# Patient Record
(demographics unavailable — no encounter records)

---

## 2024-11-06 NOTE — HISTORY OF PRESENT ILLNESS
[FreeTextEntry1] : Verbal consent for telehealth services was obtained from the patient. Visit provided using real-time telehealth services with two-way video and audio platform.  The location of the patient: [facility] The location of the Provider: [address] [place] [address]  Persons participating in the telehealth service and their role in the encounter: Patient: Cathie Urrutia Physician: Dr. Shae Black  66yoF previously evaluated via telehealth visit 6mos prior for incidental finding of b/l ICA stenosis on CT head, confirmed w/dedicated carotid duplex which revealed 50-69% R ICA stenosis, <50% L ICA stenosis.  Pt returns today for 6mo follow-up visit/surveillance and to discuss her 6mo carotid duplex performed 11/04/2024 at [facility] in [facility].

## 2024-11-06 NOTE — DATA REVIEWED
[FreeTextEntry1] : Carotid duplex (11/04/2024) demonstrated normal carotid velocities, no significant stenosis b/l, vertebral arteries patent w/antegrade flow b/l.